# Patient Record
Sex: FEMALE | Race: WHITE | URBAN - METROPOLITAN AREA
[De-identification: names, ages, dates, MRNs, and addresses within clinical notes are randomized per-mention and may not be internally consistent; named-entity substitution may affect disease eponyms.]

---

## 2017-08-18 ENCOUNTER — THERAPY VISIT (OUTPATIENT)
Dept: PHYSICAL THERAPY | Facility: CLINIC | Age: 37
End: 2017-08-18
Payer: COMMERCIAL

## 2017-08-18 DIAGNOSIS — M54.50 CHRONIC MIDLINE LOW BACK PAIN WITHOUT SCIATICA: Primary | ICD-10-CM

## 2017-08-18 DIAGNOSIS — G89.29 CHRONIC MIDLINE LOW BACK PAIN WITHOUT SCIATICA: Primary | ICD-10-CM

## 2017-08-18 PROCEDURE — 97161 PT EVAL LOW COMPLEX 20 MIN: CPT | Mod: GP | Performed by: PHYSICAL THERAPIST

## 2017-08-18 PROCEDURE — 97112 NEUROMUSCULAR REEDUCATION: CPT | Mod: GP | Performed by: PHYSICAL THERAPIST

## 2017-08-18 NOTE — LETTER
[facility]  [address]  [facility] [id number] [phone]    2017    Re: Merry Murphy   :   1980  MRN:  [id number]   REFERRING PHYSICIAN:   Elia Crouch    [facility]  Date of Initial Evaluation:  17  Visits:  Rxs Used: 1  Reason for Referral:  Chronic midline low back pain without sciatica    EVALUATION SUMMARY    [facility] Initial Evaluation  Subjective:    Patient is a 37 year old female presenting with rehab back hpi. The history is provided by the patient. No  was used.   Merry Murphy is a 37 year old female with a lumbar condition.  Condition occurred with:  Degenerative joint disease.  Condition occurred: for unknown reasons.  This is a chronic condition  Has had low back pain for past 15 years for unknown reason. Saw MD most recently 17. Reviewed MRI from  (per pt, DDD at L5-S1)..    Patient reports pain:  Lower lumbar spine and lumbar spine left.  Radiates to:  Gluteals left and thigh left.  Pain is described as aching and sharp and is intermittent and reported as 3/10 and 8/10 (3/10 current; up to 8/10 depending on activity).  Associated symptoms:  Loss of motion/stiffness and loss of strength. Pain is the same all the time.  Symptoms are exacerbated by bending, lifting, sitting, standing and walking and relieved by nothing.  Since onset symptoms are gradually worsening.  Special tests:  X-ray and MRI.  Previous treatment includes chiropractic and physical therapy (Med-X program).  There was no improvement following previous treatment.  General health as reported by patient is excellent.    Medical allergies: yes.  Other surgeries include:  No.  Current medications:  None as reported by the patient.  Current occupation is ; 5 kids at home (ages 6-10); Hobbies include working  out.  Patient is working in normal job without restrictions.  Primary job tasks include:  Prolonged sitting, prolonged standing, repetitive tasks and other.    Barriers include:  None as reported by the patient.  Red flags:  None as reported by the patient.    Oswestry Score: 14 %                 Objective:       Lumbar/SI Evaluation  ROM:    AROM Lumbar:   Flexion:            To toes  Ext:                    75% limited ++pain    Side Bend:        Left:  WNL    Right:  WNL  Rotation:           Left:  WNL    Right:  Min limitation +pain R  Side Glide:        Left:     Right:    Strength: Fair-good TA activation w/ cuing     Lumbar Myotomes:  Lumbar myotomes: all levels WNL/EQ B when tested in sitting, (-) Toe walking, (-) Heel walking.    Functional Tests:    Plank prone:  60/60 sec    Lumbar Provocation:  Lumbar provocation: (+) Prone instability.    SI joint/Sacrum:    (+) Active SLR L  (+) CARRI L  (+) Torsion L  Pt self corrects during abduction muscle testing L  After: (-) Active SLR  (-) CARRI L  (-) Torsion L     Assessment/Plan:      Patient is a 37 year old female with lumbar complaints.    Patient has the following significant findings with corresponding treatment plan.                Diagnosis 1:  LBP Consistent w/ lumbar instability  Pain -  manual therapy, self management, education and home program  Decreased ROM/flexibility - manual therapy, therapeutic exercise, therapeutic activity and home program  Decreased joint mobility - manual therapy, therapeutic exercise, therapeutic activity and home program  Decreased strength - therapeutic exercise, therapeutic activities and home program  Instability -  Therapeutic Activity  Therapeutic Exercise  Neuromuscular Re-education  home program    Therapy Evaluation Codes:   1) History comprised of:   Personal factors that impact the plan of care:      Living environment, Profession and Time since onset of symptoms.    Comorbidity factors that impact the plan of  care are:      None.     Medications impacting care: None.  2) Examination of Body Systems comprised of:   Body structures and functions that impact the plan of care:      Lumbar spine.   Activity limitations that impact the plan of care are:      Bending, Lifting, Standing, Walking and Working.  3) Clinical presentation characteristics are:   Stable/Uncomplicated.    4) Decision-Making    Low complexity using standardized patient assessment instrument and/or measureable assessment of functional outcome.  Cumulative Therapy Evaluation is: Low complexity.    Previous and current functional limitations:  (See Goal Flow Sheet for this information)    Short term and Long term goals: (See Goal Flow Sheet for this information)     Communication ability:  Patient appears to be able to clearly communicate and understand verbal and written communication and follow directions correctly.  Treatment Explanation - The following has been discussed with the patient:   RX ordered/plan of care  Anticipated outcomes  Possible risks and side effects  This patient would benefit from PT intervention to resume normal activities.   Rehab potential is good.    Frequency:  2 X a month, once daily  Duration:  for 2-3 months  Discharge Plan:  Achieve all LTG.  Independent in home treatment program.  Reach maximal therapeutic benefit.    Thank you for your referral.    INQUIRIES  Therapist: Amanda Hilligoss DPMARY  [facility]  [address] [place] [address] [address]  [facility] [address]  Phone: [phone]  Fax: [phone]

## 2017-08-18 NOTE — MR AVS SNAPSHOT
"              After Visit Summary   8/18/2017    Merry Murphy    MRN: [id number]           Patient Information     Date Of Birth          1980        Visit Information        Provider Department      8/18/2017 2:30 PM Hilligoss, Amanda K, PT [facility] Physical Therapy        Today's Diagnoses     Chronic midline low back pain without sciatica    -  1       Follow-ups after your visit        Your next 10 appointments already scheduled     Sep 06, 2017  4:10 PM CDT   TASH Spine with Amanda K Hilligoss, PT   [facility]    [address]  [facility] [address] [phone]              Who to contact     If you have questions or need follow up information about today's clinic visit or your schedule please contact [facility] ATHLETIC [facility] PHYSICAL [facility] directly at [phone].  Normal or non-critical lab and imaging results will be communicated to you by [employer], letter or phone within 4 business days after the clinic has received the results. If you do not hear from us within 7 days, please contact the clinic through [employer] or phone. If you have a critical or abnormal lab result, we will notify you by phone as soon as possible.  Submit refill requests through [employer] or call your pharmacy and they will forward the refill request to us. Please allow 3 business days for your refill to be completed.          Additional Information About Your Visit        [employer] Information     [employer] lets you send messages to your doctor, view your test results, renew your prescriptions, schedule appointments and more. To sign up, go to [email or web address] . Click on \"Log in\" on the left side of the screen, which will take you to the Welcome page. Then click on \"Sign up Now\" on the right side of the page.     You will be asked to enter the access code listed below, as well as some " personal information. Please follow the directions to create your username and password.     Your access code is: [email or web address]  Expires: 2017  4:24 PM     Your access code will  in 90 days. If you need help or a new code, please call your [place] clinic or [phone].        Care EveryWhere ID     This is your Care EveryWhere ID. This could be used by other organizations to access your [place] medical records  [id number]         Blood Pressure from Last 3 Encounters:   13 128/62   13 102/68   12 108/72    Weight from Last 3 Encounters:   13 63 kg (139 lb)   13 63 kg (139 lb)   12 60.6 kg (133 lb 8 oz)              We Performed the Following     HC PT EVAL, LOW COMPLEXITY     TASH INITIAL EVAL REPORT     NEUROMUSCULAR RE-EDUCATION        Primary Care Provider    None Specified       No primary provider on file.        Equal Access to Services     FRANCA TURNER : Hadii pete degrooto Tamera, waaxda sahra, qaybta kaalmada yrn, boone zhu . So [facility] [phone].    ATENCIÓN: Si habla espscooby, tiene a flynn disposición servicios gratuitos de asistencia lingüística. Llame al [phone].    We comply with applicable federal civil rights laws and [place] laws. We do not discriminate on the basis of race, color, national origin, age, disability sex, sexual orientation or gender identity.            Thank you!     Thank you for choosing [facility]  for your care. Our goal is always to provide you with excellent care. Hearing back from our patients is one way we can continue to improve our services. Please take a few minutes to complete the written survey that you may receive in the mail after your visit with us. Thank you!             Your Updated Medication List - Protect others around you: Learn how to safely use, store and throw away your medicines at www.disposemymeds.org.           This list is accurate as of: 8/18/17  4:24 PM.  Always use your most recent med list.                   Brand Name Dispense Instructions for use Diagnosis    levonorgestrel 20 MCG/24HR IUD    MIRENA    1 each    1 each (20 mcg) by Intrauterine route once        omeprazole 40 MG capsule    priLOSEC    30 capsule    Take 1 capsule (40 mg) by mouth daily Take 30-60 minutes before a meal.    Gastritis, acute, Nausea

## 2017-08-18 NOTE — PROGRESS NOTES
[facility] Initial Evaluation  Subjective:    Patient is a 37 year old female presenting with rehab back hpi. The history is provided by the patient. No  was used.   Merry Murphy is a 37 year old female with a lumbar condition.  Condition occurred with:  Degenerative joint disease.  Condition occurred: for unknown reasons.  This is a chronic condition  Has had low back pain for past 15 years for unknown reason. Saw MD most recently 8/17/17. Reviewed MRI from 2015 (per pt, DDD at L5-S1)..    Patient reports pain:  Lower lumbar spine and lumbar spine left.  Radiates to:  Gluteals left and thigh left.  Pain is described as aching and sharp and is intermittent and reported as 3/10 and 8/10 (3/10 current; up to 8/10 depending on activity).  Associated symptoms:  Loss of motion/stiffness and loss of strength. Pain is the same all the time.  Symptoms are exacerbated by bending, lifting, sitting, standing and walking and relieved by nothing.  Since onset symptoms are gradually worsening.  Special tests:  X-ray and MRI.  Previous treatment includes chiropractic and physical therapy (Med-X program).  There was no improvement following previous treatment.  General health as reported by patient is excellent.    Medical allergies: yes.  Other surgeries include:  No.  Current medications:  None as reported by the patient.  Current occupation is ; 5 kids at home (ages 6-10); Hobbies include working out.  Patient is working in normal job without restrictions.  Primary job tasks include:  Prolonged sitting, prolonged standing, repetitive tasks and other.    Barriers include:  None as reported by the patient.    Red flags:  None as reported by the patient.      Oswestry Score: 14 %                 Objective:    System         Lumbar/SI Evaluation  ROM:    AROM Lumbar:   Flexion:            To toes  Ext:                    75% limited ++pain    Side Bend:        Left:   WNL    Right:  WNL  Rotation:           Left:  WNL    Right:  Min limitation +pain R  Side Glide:        Left:     Right:         Strength: Fair-good TA activation w/ cuing   Lumbar Myotomes:  Lumbar myotomes: all levels WNL/EQ B when tested in sitting, (-) Toe walking, (-) Heel walking.                      Functional Tests:    Plank prone:  60/60 sec      Lumbar Provocation:  Lumbar provocation: (+) Prone instability.        SI joint/Sacrum:    (+) Active SLR L  (+) CARRI L  (+) Torsion L  Pt self corrects during abduction muscle testing L  After: (-) Active SLR  (-) CARRI L  (-) Torsion L                                                         General     ROS    Assessment/Plan:      Patient is a 37 year old female with lumbar complaints.    Patient has the following significant findings with corresponding treatment plan.                Diagnosis 1:  LBP Consistent w/ lumbar instability  Pain -  manual therapy, self management, education and home program  Decreased ROM/flexibility - manual therapy, therapeutic exercise, therapeutic activity and home program  Decreased joint mobility - manual therapy, therapeutic exercise, therapeutic activity and home program  Decreased strength - therapeutic exercise, therapeutic activities and home program  Instability -  Therapeutic Activity  Therapeutic Exercise  Neuromuscular Re-education  home program    Therapy Evaluation Codes:   1) History comprised of:   Personal factors that impact the plan of care:      Living environment, Profession and Time since onset of symptoms.    Comorbidity factors that impact the plan of care are:      None.     Medications impacting care: None.  2) Examination of Body Systems comprised of:   Body structures and functions that impact the plan of care:      Lumbar spine.   Activity limitations that impact the plan of care are:      Bending, Lifting, Standing, Walking and Working.  3) Clinical presentation characteristics  are:   Stable/Uncomplicated.  4) Decision-Making    Low complexity using standardized patient assessment instrument and/or measureable assessment of functional outcome.  Cumulative Therapy Evaluation is: Low complexity.    Previous and current functional limitations:  (See Goal Flow Sheet for this information)    Short term and Long term goals: (See Goal Flow Sheet for this information)     Communication ability:  Patient appears to be able to clearly communicate and understand verbal and written communication and follow directions correctly.  Treatment Explanation - The following has been discussed with the patient:   RX ordered/plan of care  Anticipated outcomes  Possible risks and side effects  This patient would benefit from PT intervention to resume normal activities.   Rehab potential is good.    Frequency:  2 X a month, once daily  Duration:  for 2-3 months  Discharge Plan:  Achieve all LTG.  Independent in home treatment program.  Reach maximal therapeutic benefit.    Please refer to the daily flowsheet for treatment today, total treatment time and time spent performing 1:1 timed codes.

## 2018-02-13 ENCOUNTER — THERAPY VISIT (OUTPATIENT)
Dept: PHYSICAL THERAPY | Facility: CLINIC | Age: 38
End: 2018-02-13
Payer: COMMERCIAL

## 2018-02-13 DIAGNOSIS — M54.50 CHRONIC MIDLINE LOW BACK PAIN WITHOUT SCIATICA: ICD-10-CM

## 2018-02-13 DIAGNOSIS — G89.29 CHRONIC MIDLINE LOW BACK PAIN WITHOUT SCIATICA: ICD-10-CM

## 2018-02-13 PROCEDURE — 97112 NEUROMUSCULAR REEDUCATION: CPT | Mod: GP | Performed by: PHYSICAL THERAPIST

## 2018-02-13 PROCEDURE — 97110 THERAPEUTIC EXERCISES: CPT | Mod: GP | Performed by: PHYSICAL THERAPIST

## 2018-02-13 NOTE — MR AVS SNAPSHOT
"              After Visit Summary   2/13/2018    Merry Murphy    MRN: [id number]           Patient Information     Date Of Birth          1980        Visit Information        Provider Department      2/13/2018 5:10 PM Price House PT [facility] Physical Therapy        Today's Diagnoses     Chronic midline low back pain without sciatica           Follow-ups after your visit        Your next 10 appointments already scheduled     Feb 27, 2018  4:30 PM CST   TASH Spine with Price House PT   [facility] Physical Therapy ([facility] [place]  )    [address]  [facility] [address] [phone]              Who to contact     If you have questions or need follow up information about today's clinic visit or your schedule please contact [facility] PHYSICAL [facility] directly at [phone].  Normal or non-critical lab and imaging results will be communicated to you by [employer], letter or phone within 4 business days after the clinic has received the results. If you do not hear from us within 7 days, please contact the clinic through [employer] or phone. If you have a critical or abnormal lab result, we will notify you by phone as soon as possible.  Submit refill requests through [employer] or call your pharmacy and they will forward the refill request to us. Please allow 3 business days for your refill to be completed.          Additional Information About Your Visit        [employer] Information     [employer] lets you send messages to your doctor, view your test results, renew your prescriptions, schedule appointments and more. To sign up, go to [email or web address] . Click on \"Log in\" on the left side of the screen, which will take you to the Welcome page. Then click on \"Sign up Now\" on the right side of the page.     You will be asked to enter the access code listed below, as well as some personal " information. Please follow the directions to create your username and password.     Your access code is: [email or web address]  Expires: 2018  5:58 PM     Your access code will  in 90 days. If you need help or a new code, please call your [place] clinic or [phone].        Care EveryWhere ID     This is your Care EveryWhere ID. This could be used by other organizations to access your [place] medical records  [id number]         Blood Pressure from Last 3 Encounters:   13 128/62   13 102/68   12 108/72    Weight from Last 3 Encounters:   13 63 kg (139 lb)   13 63 kg (139 lb)   12 60.6 kg (133 lb 8 oz)              We Performed the Following     TASH PROGRESS NOTES REPORT     NEUROMUSCULAR RE-EDUCATION     THERAPEUTIC EXERCISES        Primary Care Provider Fax #    Provider Not In System [phone]                Equal Access to Services     FRANCA TURNER : Hadii pete Philip, wamatt bocanegra, qaybta kaalmada yrn, boone zhu . So [facility] [phone].    ATENCIÓN: Si catela español, tiene a flynn disposición servicios gratuitos de asistencia lingüística. Llame al [phone].    We comply with applicable federal civil rights laws and [place] laws. We do not discriminate on the basis of race, color, national origin, age, disability, sex, sexual orientation, or gender identity.            Thank you!     Thank you for choosing [facility]ERAP[facility]  for your care. Our goal is always to provide you with excellent care. Hearing back from our patients is one way we can continue to improve our services. Please take a few minutes to complete the written survey that you may receive in the mail after your visit with us. Thank you!             Your Updated Medication List - Protect others around you: Learn how to safely use, store and throw away your medicines at www.disposemymeds.org.          This list  is accurate as of 2/13/18  5:58 PM.  Always use your most recent med list.                   Brand Name Dispense Instructions for use Diagnosis    levonorgestrel 20 MCG/24HR IUD    MIRENA    1 each    1 each (20 mcg) by Intrauterine route once        omeprazole 40 MG capsule    priLOSEC    30 capsule    Take 1 capsule (40 mg) by mouth daily Take 30-60 minutes before a meal.    Gastritis, acute, Nausea

## 2018-02-13 NOTE — LETTER
[facility] ATHLETIC [facility]  [address]  [facility] [address] [phone]    2018    Re: Merry Murphy   :   1980  MRN:  [id number]   REFERRING PHYSICIAN:   Elia Crouch    [facility]  Date of Initial Evaluation:  17  Visits:  Rxs Used: 2  Reason for Referral:  Chronic midline low back pain without sciatica    Oswestry Score: 18 %                 PROGRESS  REPORT  Progress reporting period is from 17 to 18.       SUBJECTIVE  Subjective changes noted by patient:  was doing pretty well, back got more sore with starting new weight lifting routine with weights in 2018,  had a medial n block with some relief, occasional trouble wtih sleep, pain increases a lot of laying prone too long, back feels really stiff with bending forward, will have pain if lifting thing up from floor, had some muscle spasms on L side after painting room   Current Pain level: 4/10 (worst 8/10).     Previous pain level was  NA Initial Pain level: 8/10 (at worst).   Changes in function:  Yes (See Goal flowsheet attached for changes in current functional level)  Adverse reaction to treatment or activity: activity - bending forward/backwards    OBJECTIVE  Changes noted in objective findings:    Objective: lumbar ROM: flex 33%, ext 33%, R SB 80%, L SB 80%,  Double leg lowering 70%, + prone instability L5, MMT: hip ext 4/5 B, hip ER 4+/5 B    ASSESSMENT/PLAN  Updated problem list and treatment plan: Diagnosis 1:  Low back pain consistent with functional instability L5-S1  Pain -  hot/cold therapy, manual therapy, self management, education and home program  Decreased ROM/flexibility - manual therapy, therapeutic exercise and home program  Decreased strength - therapeutic exercise, therapeutic activities and home program  Decreased function - therapeutic activities and home program  STG/LTGs have  been met or progress has been made towards goals:  Yes (See Goal flow sheet completed today.)  Assessment of Progress: The patient's condition has exacerbated.  Self Management Plans:  Patient has been instructed in a home treatment program.  I have re-evaluated this patient and find that the nature, scope, duration and intensity of the therapy is appropriate for the medical condition of the patient.  Merry continues to require the following intervention to meet STG and LTG's:  PT    Recommendations:  This patient would benefit from continued therapy.     Frequency:  1 X/2 week, once daily  Duration:  for 4 weeks        Thank you for your referral.    INQUIRIES    Therapist: Price House,PT, DPT, [facility]    [facility]  [address] [place], [place] [address]  [facility] [address]  Phone: [phone]  Fax: [phone]

## 2018-02-13 NOTE — PROGRESS NOTES
Subjective:  HPI  Oswestry Score: 18 %                 Objective:  System    Physical Exam    General     ROS    Assessment/Plan:    PROGRESS  REPORT    Progress reporting period is from 8/18/17 to 2/13/18.       SUBJECTIVE  Subjective changes noted by patient:  was doing pretty well, back got more sore with starting new weight lifting routine with weights in Jan 2018,  had a medial n block with some relief, occasional trouble wtih sleep, pain increases a lot of laying prone too long, back feels really stiff with bending forward, will have pain if lifting thing up from floor, had some muscle spasms on L side after painting room   Current Pain level: 4/10 (worst 8/10).     Previous pain level was  NA Initial Pain level: 8/10 (at worst).   Changes in function:  Yes (See Goal flowsheet attached for changes in current functional level)  Adverse reaction to treatment or activity: activity - bending forward/backwards    OBJECTIVE  Changes noted in objective findings:    Objective: lumbar ROM: flex 33%, ext 33%, R SB 80%, L SB 80%,  Double leg lowering 70%, + prone instability L5, MMT: hip ext 4/5 B, hip ER 4+/5 B    ASSESSMENT/PLAN  Updated problem list and treatment plan: Diagnosis 1:  Low back pain consistent with functional instability L5-S1    Pain -  hot/cold therapy, manual therapy, self management, education and home program  Decreased ROM/flexibility - manual therapy, therapeutic exercise and home program  Decreased strength - therapeutic exercise, therapeutic activities and home program  Decreased function - therapeutic activities and home program  STG/LTGs have been met or progress has been made towards goals:  Yes (See Goal flow sheet completed today.)  Assessment of Progress: The patient's condition has exacerbated.  Self Management Plans:  Patient has been instructed in a home treatment program.  I have re-evaluated this patient and find that the nature, scope, duration and intensity of the therapy is  appropriate for the medical condition of the patient.  Merry continues to require the following intervention to meet STG and LTG's:  PT    Recommendations:  This patient would benefit from continued therapy.     Frequency:  1 X/2 week, once daily  Duration:  for 4 weeks        Please refer to the daily flowsheet for treatment today, total treatment time and time spent performing 1:1 timed codes.      Price House,PT, DPT, OCS

## 2018-02-27 ENCOUNTER — THERAPY VISIT (OUTPATIENT)
Dept: PHYSICAL THERAPY | Facility: CLINIC | Age: 38
End: 2018-02-27
Payer: COMMERCIAL

## 2018-02-27 DIAGNOSIS — G89.29 CHRONIC MIDLINE LOW BACK PAIN WITHOUT SCIATICA: ICD-10-CM

## 2018-02-27 DIAGNOSIS — M54.50 CHRONIC MIDLINE LOW BACK PAIN WITHOUT SCIATICA: ICD-10-CM

## 2018-02-27 PROCEDURE — 97110 THERAPEUTIC EXERCISES: CPT | Mod: GP | Performed by: PHYSICAL THERAPIST

## 2018-02-27 PROCEDURE — 97140 MANUAL THERAPY 1/> REGIONS: CPT | Mod: GP | Performed by: PHYSICAL THERAPIST

## 2018-02-27 PROCEDURE — 97112 NEUROMUSCULAR REEDUCATION: CPT | Mod: GP | Performed by: PHYSICAL THERAPIST

## 2018-02-27 NOTE — MR AVS SNAPSHOT
"              After Visit Summary   2/27/2018    Merry Murphy    MRN: [id number]           Patient Information     Date Of Birth          1980        Visit Information        Provider Department      2/27/2018 4:30 PM Price House PT [facility]        Today's Diagnoses     Chronic midline low back pain without sciatica           Follow-ups after your visit        Your next 10 appointments already scheduled     Mar 13, 2018  4:30 PM CDT   [facility] with Price House PT   [facility] ([facility] [place]  )    [address]  [facility] [address] [phone]              Who to contact     If you have questions or need follow up information about today's clinic visit or your schedule please contact [facility] directly at [phone].  Normal or non-critical lab and imaging results will be communicated to you by [employer], letter or phone within 4 business days after the clinic has received the results. If you do not hear from us within 7 days, please contact the clinic through [employer] or phone. If you have a critical or abnormal lab result, we will notify you by phone as soon as possible.  Submit refill requests through [employer] or call your pharmacy and they will forward the refill request to us. Please allow 3 business days for your refill to be completed.          Additional Information About Your Visit        [employer] Information     [employer] lets you send messages to your doctor, view your test results, renew your prescriptions, schedule appointments and more. To sign up, go to [email or web address] . Click on \"Log in\" on the left side of the screen, which will take you to the Welcome page. Then click on \"Sign up Now\" on the right side of the page.     You will be asked to enter the access code listed below, as well as some personal " information. Please follow the directions to create your username and password.     Your access code is: [email or web address]  Expires: 2018  5:58 PM     Your access code will  in 90 days. If you need help or a new code, please call your [facility] clinic or [phone].        Care EveryWhere ID     This is your Care EveryWhere ID. This could be used by other organizations to access your [facility] medical records  [id number]         Blood Pressure from Last 3 Encounters:   13 128/62   13 102/68   12 108/72    Weight from Last 3 Encounters:   13 63 kg (139 lb)   13 63 kg (139 lb)   12 60.6 kg (133 lb 8 oz)              We Performed the Following     MANUAL THER TECH,1+REGIONS,EA 15 MIN     NEUROMUSCULAR RE-EDUCATION     THERAPEUTIC EXERCISES        Primary Care Provider Fax #    Provider Not In System [phone]                Equal Access to Services     FRANCA TURNER : Hadii pete ku hadasho Soomaali, waaxda luqadaha, qaybta kaalmada adeegyago, boone zhu . So [facility] [phone].    ATENCIÓN: Si breanna alberts, tiene a flynn disposición servicios gratuitos de asistencia lingüística. Llame al [phone].    We comply with applicable federal civil rights laws and [place] laws. We do not discriminate on the basis of race, color, national origin, age, disability, sex, sexual orientation, or gender identity.            Thank you!     Thank you for choosing [facility]  for your care. Our goal is always to provide you with excellent care. Hearing back from our patients is one way we can continue to improve our services. Please take a few minutes to complete the written survey that you may receive in the mail after your visit with us. Thank you!             Your Updated Medication List - Protect others around you: Learn how to safely use, store and throw away your medicines at www.disposemymeds.org.           This list is accurate as of 2/27/18  5:40 PM.  Always use your most recent med list.                   Brand Name Dispense Instructions for use Diagnosis    levonorgestrel 20 MCG/24HR IUD    MIRENA    1 each    1 each (20 mcg) by Intrauterine route once        omeprazole 40 MG capsule    priLOSEC    30 capsule    Take 1 capsule (40 mg) by mouth daily Take 30-60 minutes before a meal.    Gastritis, acute, Nausea

## 2018-04-16 PROBLEM — G89.29 CHRONIC MIDLINE LOW BACK PAIN WITHOUT SCIATICA: Status: RESOLVED | Noted: 2017-08-18 | Resolved: 2018-04-16

## 2018-04-16 PROBLEM — M54.50 CHRONIC MIDLINE LOW BACK PAIN WITHOUT SCIATICA: Status: RESOLVED | Noted: 2017-08-18 | Resolved: 2018-04-16

## 2018-04-16 NOTE — PROGRESS NOTES
Discharge Note    Progress reporting period is from initial eval to Feb 27, 2018.     Merry failed to return for next follow up visit and current status is unknown.  Please see information below for last relevant information on current status.  Patient seen for 3 visits.  SUBJECTIVE  Subjective changes noted by patient:  not doing well for past couple of weeks, back pain seems to come and go in waves, has been doing less impact for exercises, doing more elliptical and spin cycle, sleeping going better, will be tight upon waking in AM,   .  Current pain level is 2/10 (worst 7/10).     Previous pain level was  8/10 (at worst).   Changes in function:  Yes (See Goal flowsheet attached for changes in current functional level)  Adverse reaction to treatment or activity: None    OBJECTIVE  Changes noted in objective findings: lumbar ROM: flex 100%, ext 50%, R SB 75%, L SB 75%, hypomobile T4-T8, double leg lowering 70%     ASSESSMENT/PLAN  Diagnosis: LBP w/ instability and DDD   DIAGP:  The encounter diagnosis was Chronic midline low back pain without sciatica.  Updated problem list and treatment plan:   Pain - HEP  Decreased ROM/flexibility - HEP  Decreased function - HEP  Decreased strength - HEP  STG/LTGs have been met or progress has been made towards goals:  Yes, please see goal flowsheet for most current information  Assessment of Progress: current status is unknown.    Last current status: Pt is progressing as expected   Self Management Plans:  HEP  I have re-evaluated this patient and find that the nature, scope, duration and intensity of the therapy is appropriate for the medical condition of the patient.  Merry continues to require the following intervention to meet STG and LTG's:  HEP.    Recommendations:  Discharge with current home program.  Patient to follow up with MD as needed.    Please refer to the daily flowsheet for treatment today, total treatment time and time spent performing 1:1 timed codes.    Price  Olmscheid,PT, DPT, OCS